# Patient Record
Sex: FEMALE | Employment: UNEMPLOYED | URBAN - METROPOLITAN AREA
[De-identification: names, ages, dates, MRNs, and addresses within clinical notes are randomized per-mention and may not be internally consistent; named-entity substitution may affect disease eponyms.]

---

## 2018-01-16 ENCOUNTER — TRANSFERRED RECORDS (OUTPATIENT)
Dept: HEALTH INFORMATION MANAGEMENT | Facility: CLINIC | Age: 63
End: 2018-01-16

## 2018-01-16 ENCOUNTER — MEDICAL CORRESPONDENCE (OUTPATIENT)
Dept: HEALTH INFORMATION MANAGEMENT | Facility: CLINIC | Age: 63
End: 2018-01-16

## 2018-04-13 ENCOUNTER — TRANSFERRED RECORDS (OUTPATIENT)
Dept: HEALTH INFORMATION MANAGEMENT | Facility: CLINIC | Age: 63
End: 2018-04-13

## 2018-06-28 ENCOUNTER — TRANSFERRED RECORDS (OUTPATIENT)
Dept: HEALTH INFORMATION MANAGEMENT | Facility: CLINIC | Age: 63
End: 2018-06-28

## 2018-10-10 ENCOUNTER — OFFICE VISIT (OUTPATIENT)
Dept: ENDOCRINOLOGY | Facility: CLINIC | Age: 63
End: 2018-10-10
Payer: COMMERCIAL

## 2018-10-10 VITALS
HEART RATE: 80 BPM | WEIGHT: 153.2 LBS | HEIGHT: 64 IN | SYSTOLIC BLOOD PRESSURE: 123 MMHG | BODY MASS INDEX: 26.15 KG/M2 | DIASTOLIC BLOOD PRESSURE: 69 MMHG

## 2018-10-10 DIAGNOSIS — E05.00 GRAVES DISEASE: Primary | ICD-10-CM

## 2018-10-10 RX ORDER — GABAPENTIN 300 MG/1
600 CAPSULE ORAL
COMMUNITY
Start: 2017-07-14

## 2018-10-10 RX ORDER — METHIMAZOLE 10 MG/1
5 TABLET ORAL 3 TIMES DAILY
COMMUNITY
Start: 2017-06-26 | End: 2019-01-23

## 2018-10-10 ASSESSMENT — PAIN SCALES - GENERAL: PAINLEVEL: NO PAIN (0)

## 2018-10-10 NOTE — PATIENT INSTRUCTIONS
- Continue Methimazole 15 mg (3 tablets of 5 mg each) for the next month  - Take the lab slip to S lab in 1 month for a thyroid lab.   - If you do not hear from me about your test in 1 week, call our clinic at [phone].     Follow up: 3 months    Schedule with me on a Wednesday or a Friday.       Yarelsi Petersen MD  Endocrinology, Diabetes and Metabolism  [facility]

## 2018-10-10 NOTE — LETTER
10/10/2018       RE: Annamaria Hall  [address]  [facility] [address]     Dear Colleague,    Thank you for referring your patient, Annamaria Hall, to the [facility] ENDOCRINOLOGY at [facility]. Please see a copy of my visit note below.    Endocrinology Clinic Visit 10/10/2018    NAME:  Annamaria Hall  PCP:  No primary care provider on file.  MRN:  [id number]  Reason for Consult:  Graves disease f/up  Requesting Provider:  Referred Self    Chief Complaint     Chief Complaint   Patient presents with     Consult     hypothyroidism        History of Present Illness     Annamaria Hall is a 62 year old female who is seen in clinic for f/up on Graves disease.     She is well known to me from [place], [place]. Briefly, she was diagnosed with subclinical hyperthyroidism in Nov 2015 with suppressed TSH, normal T4 and T3. TPO was elevated. She has a daughter with hyperthyroidism as well.   Workup done by me in Dec 2015 showed elevated free T3 of 4.0, a suppressed TSH of <0.05 and a normal free T4.   I-123 uptake and scan done Jan 2016 showed a mildly elevated uptake of 35.8% at 24 hrs, that was homogeneous, consistent with Graves disease. Methimazole was started Feb 2016. TSH was 0.01 June 207. At that time she was on methimazole 5 mg daily, and since then I increased her methimazole to 10 mg daily. No TSH since in Care Everywhere.      Labs done at [facility] 9/26/2018: TSH 0.02, fT4: 2.77.   Her PCP increased her dosage to 15 mg daily as of 9/26/18.   Today she feels well. She denies any palpitations, tremors, weight gain or loss,sleep disturbances, bowel issues, anxiety, etc....    Problem List     Patient Active Problem List   Diagnosis     Graves disease        Medications     Current Outpatient Prescriptions   Medication     gabapentin (NEURONTIN) 300 MG capsule     methimazole (TAPAZOLE) 10 MG tablet     No current facility-administered medications for this visit.         Allergies  "    Allergies   Allergen Reactions     Codeine Nausea and Vomiting     Lidocaine Nausea and Vomiting     Lidocaine patch       Medical / Surgical History     No past medical history on file.  No past surgical history on file.    Social History     Social History     Social History     Marital status: Single     Spouse name: N/A     Number of children: N/A     Years of education: N/A     Occupational History     Not on file.     Social History Main Topics     Smoking status: Not on file     Smokeless tobacco: Not on file     Alcohol use Not on file     Drug use: Not on file     Sexual activity: Not on file     Other Topics Concern     Not on file     Social History Narrative       Family History     No family history on file.    ROS     Constitutional: no fevers, chills, night sweats. No weight loss/gain. No fatigue. Good appetite  Eyes: no vision changes, no eye redness, no diplopia  Ears, Nose, mouth, throat: no hearing changes, no tinnitus, no rhinorrhea, no nasal congestion  Cardiovascular: no chest pain, no orthopnea or PND, no edema, no palpitations  Respiratory: no dyspnea, no cough, no sputum, no wheezing  Gastrointestinal: no nausea, no vomiting, no abdominal pain, no diarrhea, no constipation  Genitourinary: no dysuria, no frequency, no urgency, no nocturia  Musculoskeletal: no joint pains, no back pain, no cramps, no fractures  Skin: no rash, no itching, no dryness, no ulcers, no hair loss, no nail changes  Neurological:no headaches, no weakness, no numbness, no tingling, no tremors, no difficulty sleeping  Psychiatric: no anxiety, no sadness  Hematologic/lymphatic: no easy bruising, no bleeding, no palor    Physical Exam   /69  Pulse 80  Ht 1.626 m (5' 4\")  Wt 69.5 kg (153 lb 3.2 oz)  BMI 26.3 kg/m2     General: Comfortable, no obvious distress, normal body habitus  Eyes: Sclera anicteric, moist conjunctiva  HENT: Atraumatic, oropharynx clear, moist mucous membranes with no mucosal " ulcerations  Neck: Trachea midline, supple. Thyroid: Thyroid is normal in size and texture  CV: Regular rhythm, normal rate. No murmurs auscultated  Resp: Clear to auscultation bilaterally, good effort  Abdomen:  Soft, non tender, non distended. Bowel sounds heard. No organomegaly. No striae  Skin: No rashes, lesions, or subcutaneous nodules.   Psych: Alert and oriented x 3. Appropriate affect, good insight  Extremities: No peripheral edema  Musculoskeletal: Appropriate muscle bulk and strength  Lymphatic: No cervical lymphadenopathy  Neuro: Moves all four extremities. No focal deficits on limited exam. Gait normal.     Labs/Imaging     Pertinent Labs were reviewed and updated in EPIC.  I personally reviewed the patient's outside records from Care Everywhere. Summary of pertinent findings in HPI.    Impression / Plan     1. Graves disease:   Has been on higher dose of methimazole 15 mg daily for 2 weeks  Plan:   - continue current dose  - recheck labs in one month  Lab slip given to be done at [facility]        Follow up: 3 months      Yarelis Petersen MD  Endocrinology, Diabetes and Metabolism  [facility]      Again, thank you for allowing me to participate in the care of your patient.      Sincerely,    Yarelis Petersen MD

## 2018-10-10 NOTE — PROGRESS NOTES
Endocrinology Clinic Visit 10/10/2018    NAME:  Annamaria Hall  PCP:  No primary care provider on file.  MRN:  [id number]  Reason for Consult:  Graves disease f/up  Requesting Provider:  Referred Self    Chief Complaint     Chief Complaint   Patient presents with     Consult     hypothyroidism        History of Present Illness     Annamaria Hall is a 62 year old female who is seen in clinic for f/up on Graves disease.     She is well known to me from [place], [place]. Briefly, she was diagnosed with subclinical hyperthyroidism in Nov 2015 with suppressed TSH, normal T4 and T3. TPO was elevated. She has a daughter with hyperthyroidism as well.   Workup done by me in Dec 2015 showed elevated free T3 of 4.0, a suppressed TSH of <0.05 and a normal free T4.   I-123 uptake and scan done Jan 2016 showed a mildly elevated uptake of 35.8% at 24 hrs, that was homogeneous, consistent with Graves disease. Methimazole was started Feb 2016. TSH was 0.01 June 207. At that time she was on methimazole 5 mg daily, and since then I increased her methimazole to 10 mg daily. No TSH since in Care Everywhere.      Labs done at [facility] 9/26/2018: TSH 0.02, fT4: 2.77.   Her PCP increased her dosage to 15 mg daily as of 9/26/18.   Today she feels well. She denies any palpitations, tremors, weight gain or loss,sleep disturbances, bowel issues, anxiety, etc....    Problem List     Patient Active Problem List   Diagnosis     Graves disease        Medications     Current Outpatient Prescriptions   Medication     gabapentin (NEURONTIN) 300 MG capsule     methimazole (TAPAZOLE) 10 MG tablet     No current facility-administered medications for this visit.         Allergies     Allergies   Allergen Reactions     Codeine Nausea and Vomiting     Lidocaine Nausea and Vomiting     Lidocaine patch       Medical / Surgical History     No past medical history on file.  No past surgical history on file.    Social History     Social History     Social History  "    Marital status: Single     Spouse name: N/A     Number of children: N/A     Years of education: N/A     Occupational History     Not on file.     Social History Main Topics     Smoking status: Not on file     Smokeless tobacco: Not on file     Alcohol use Not on file     Drug use: Not on file     Sexual activity: Not on file     Other Topics Concern     Not on file     Social History Narrative       Family History     No family history on file.    ROS     Constitutional: no fevers, chills, night sweats. No weight loss/gain. No fatigue. Good appetite  Eyes: no vision changes, no eye redness, no diplopia  Ears, Nose, mouth, throat: no hearing changes, no tinnitus, no rhinorrhea, no nasal congestion  Cardiovascular: no chest pain, no orthopnea or PND, no edema, no palpitations  Respiratory: no dyspnea, no cough, no sputum, no wheezing  Gastrointestinal: no nausea, no vomiting, no abdominal pain, no diarrhea, no constipation  Genitourinary: no dysuria, no frequency, no urgency, no nocturia  Musculoskeletal: no joint pains, no back pain, no cramps, no fractures  Skin: no rash, no itching, no dryness, no ulcers, no hair loss, no nail changes  Neurological:no headaches, no weakness, no numbness, no tingling, no tremors, no difficulty sleeping  Psychiatric: no anxiety, no sadness  Hematologic/lymphatic: no easy bruising, no bleeding, no palor    Physical Exam   /69  Pulse 80  Ht 1.626 m (5' 4\")  Wt 69.5 kg (153 lb 3.2 oz)  BMI 26.3 kg/m2     General: Comfortable, no obvious distress, normal body habitus  Eyes: Sclera anicteric, moist conjunctiva  HENT: Atraumatic, oropharynx clear, moist mucous membranes with no mucosal ulcerations  Neck: Trachea midline, supple. Thyroid: Thyroid is normal in size and texture  CV: Regular rhythm, normal rate. No murmurs auscultated  Resp: Clear to auscultation bilaterally, good effort  Abdomen:  Soft, non tender, non distended. Bowel sounds heard. No organomegaly. No " striae  Skin: No rashes, lesions, or subcutaneous nodules.   Psych: Alert and oriented x 3. Appropriate affect, good insight  Extremities: No peripheral edema  Musculoskeletal: Appropriate muscle bulk and strength  Lymphatic: No cervical lymphadenopathy  Neuro: Moves all four extremities. No focal deficits on limited exam. Gait normal.     Labs/Imaging     Pertinent Labs were reviewed and updated in EPIC.  I personally reviewed the patient's outside records from Care Everywhere. Summary of pertinent findings in HPI.    Impression / Plan     1. Graves disease:   Has been on higher dose of methimazole 15 mg daily for 2 weeks  Plan:   - continue current dose  - recheck labs in one month  Lab slip given to be done at [facility] [place]        Follow up: 3 months      Yarelis Petersen MD  Endocrinology, Diabetes and Metabolism  [facility]

## 2018-10-10 NOTE — MR AVS SNAPSHOT
After Visit Summary   10/10/2018    Annamaria Hall    MRN: [id number]           Patient Information     Date Of Birth          1955        Visit Information        Provider Department      10/10/2018 3:00 PM Yarelis Petersen MD [facility] Endocrinology        Today's Diagnoses     Graves disease    -  1      Care Instructions    - Continue Methimazole 15 mg (3 tablets of 5 mg each) for the next month  - Take the lab slip to S lab in 1 month for a thyroid lab.   - If you do not hear from me about your test in 1 week, call our clinic at [phone].     Follow up: 3 months    Schedule with me on a Wednesday or a Friday.       Yarelis Petersen MD  Endocrinology, Diabetes and Metabolism  [facility]            Follow-ups after your visit        Follow-up notes from your care team     Return in about 3 months (around 1/10/2019).      Your next 10 appointments already scheduled     Jan 11, 2019 11:00 AM CST   (Arrive by 10:45 AM)   RETURN ENDOCRINE with Yarelis Petersen MD   [facility] Endocr[facility] ([facility])    [address] [place] [id number] [phone]              Who to contact     Please call your clinic at [phone] to:    Ask questions about your health    Make or cancel appointments    Discuss your medicines    Learn about your test results    Speak to your doctor            Additional Information About Your Visit        MyChart Information     [employer] is an electronic gateway that provides easy, online access to your medical records. With [employer], you can request a clinic appointment, read your test results, renew a prescription or communicate with your care team.     To sign up for [employer] visit the website at [email or web address]   You will be asked to enter the access code listed below, as well as some personal information. Please follow the directions to create your username and password.     Your access  "code is: [email or web address]  Expires: 2018  6:31 AM     Your access code will  in 90 days. If you need help or a new code, please contact your [facility] or call [phone] for assistance.        Care EveryWhere ID     This is your Care EveryWhere ID. This could be used by other organizations to access your [facility] medical records  [id number]        Your Vitals Were     Pulse Height BMI (Body Mass Index)             80 1.626 m (5' 4\") 26.3 kg/m2          Blood Pressure from Last 3 Encounters:   10/10/18 123/69    Weight from Last 3 Encounters:   10/10/18 69.5 kg (153 lb 3.2 oz)              Today, you had the following     No orders found for display       Primary Care Provider    None Specified       No primary provider on file.        Equal Access to Services     RAFACedars-Sinai Medical CenterMELI : Hadii alesha Ch, waaxda patrickqadaha, qaybta kaalmada manuel, mabel lin . So [facility] [phone].    ATENCIÓN: Si habla español, tiene a bourgeois disposición servicios gratuitos de asistencia lingüística. Bettie al [phone].    We comply with applicable federal civil rights laws and [place] laws. We do not discriminate on the basis of race, color, national origin, age, disability, sex, sexual orientation, or gender identity.            Thank you!     Thank you for choosing [facility]  for your care. Our goal is always to provide you with excellent care. Hearing back from our patients is one way we can continue to improve our services. Please take a few minutes to complete the written survey that you may receive in the mail after your visit with us. Thank you!             Your Updated Medication List - Protect others around you: Learn how to safely use, store and throw away your medicines at www.disposemymeds.org.          This list is accurate as of 10/10/18  3:07 PM.  Always use your most recent med list.                   Brand Name Dispense " Instructions for use Diagnosis    gabapentin 300 MG capsule    NEURONTIN     600 mg        methimazole 10 MG tablet    TAPAZOLE     5 mg 3 times daily

## 2018-11-15 ENCOUNTER — TRANSFERRED RECORDS (OUTPATIENT)
Dept: HEALTH INFORMATION MANAGEMENT | Facility: CLINIC | Age: 63
End: 2018-11-15

## 2018-11-15 LAB
ALBUMIN SERPL-MCNC: 4.3 G/DL
ALP SERPL-CCNC: 180 U/L
ALT SERPL-CCNC: 30 U/L
ANION GAP SERPL CALCULATED.3IONS-SCNC: 14.1 MMOL/L
ANION GAP SERPL CALCULATED.3IONS-SCNC: NORMAL MMOL/L
AST SERPL-CCNC: 27 U/L
BASOPHILS # BLD AUTO: 0.03 X10E3/UL
BASOPHILS NFR BLD AUTO: 0.4 %
BILIRUB SERPL-MCNC: 0.2 MG/DL
BUN SERPL-MCNC: 11 MG/DL
BUN SERPL-MCNC: NORMAL MG/DL
BUN/CREATININE RATIO: NORMAL
CALCIUM SERPL-MCNC: 9.6 MG/DL
CALCIUM SERPL-MCNC: NORMAL MG/DL
CHLORIDE SERPLBLD-SCNC: 109 MMOL/L
CHLORIDE SERPLBLD-SCNC: NORMAL MMOL/L
CHOL/HDL RATIO - HISTORICAL: 3
CHOLEST SERPL-MCNC: 150 MG/DL
CO2 SERPL-SCNC: 26 MMOL/L
CO2 SERPL-SCNC: NORMAL MMOL/L
CREAT SERPL-MCNC: 0.6 MG/DL
CREAT SERPL-MCNC: NORMAL MG/DL
EOSINOPHIL # BLD AUTO: 0.22 X10E3/UL
EOSINOPHIL NFR BLD AUTO: 2.6 %
ERYTHROCYTE [SEDIMENTATION RATE] IN BLOOD: 16 MM/HR
GFR SERPL CREATININE-BSD FRML MDRD: >60 ML/MIN/1.73M2
GFR SERPL CREATININE-BSD FRML MDRD: NORMAL ML/MIN/1.73M2
GLOBULIN: 4.1 G/DL
GLUCOSE SERPL-MCNC: 103 MG/DL (ref 70–99)
GLUCOSE SERPL-MCNC: NORMAL MG/DL (ref 70–99)
HBA1C MFR BLD: 5.7 % (ref 0–5.7)
HCT VFR BLD AUTO: 43.8 %
HDLC SERPL-MCNC: 48 MG/DL
HEMOGLOBIN: 14.4 G/DL
LDL CHOLESTEROL: 70 MG/DL
LYMPHOCYTES # BLD AUTO: 3.29 X10E3/UL
LYMPHOCYTES NFR BLD AUTO: 39.3 %
MCH RBC QN AUTO: 25.9 PG
MCHC RBC AUTO-ENTMCNC: 32.9 G/DL
MCV RBC AUTO: 78.8 FL
MONOCYTES # BLD AUTO: 0.58 X10E3/UL
MONOCYTES NFR BLD AUTO: 6.9 %
NEUTROPHILS # BLD AUTO: 4.25 X10E3/UL
NEUTROPHILS NFR BLD AUTO: 50.7 %
PLATELET COUNT - QUEST: 264 10^9/L (ref 150–450)
PMV BLD: 10.3 FL
POTASSIUM SERPL-SCNC: 4.3 MMOL/L
POTASSIUM SERPL-SCNC: NORMAL MMOL/L
PROT SERPL-MCNC: 8.4 G/DL
PROTEIN C ACTIVITY: <0.5 % NORMAL
RBC # BLD AUTO: 5.56 10^12/L
SODIUM SERPL-SCNC: 144 MMOL/L
SODIUM SERPL-SCNC: NORMAL MMOL/L
T4 FREE SERPL-MCNC: 1.27 NG/DL
TRIGL SERPL-MCNC: 161 MG/DL
TSH SERPL-ACNC: <0.02 MCU/ML
WBC # BLD AUTO: 8.4 10^9/L

## 2018-11-16 ENCOUNTER — DOCUMENTATION ONLY (OUTPATIENT)
Dept: ENDOCRINOLOGY | Facility: CLINIC | Age: 63
End: 2018-11-16

## 2018-11-20 ENCOUNTER — TELEPHONE (OUTPATIENT)
Dept: ENDOCRINOLOGY | Facility: CLINIC | Age: 63
End: 2018-11-20

## 2018-11-20 LAB
ALBUMIN SERPL-MCNC: 4.3 G/DL (ref 3.5–5)
ALP SERPL-CCNC: 180 U/L (ref 22–124)
ALT SERPL-CCNC: 30 U/L (ref 9–52)
ANION GAP SERPL CALCULATED.3IONS-SCNC: 14.1 MMOL/L (ref 10–20)
AST SERPL-CCNC: 27 U/L (ref 14–36)
BASOPHILS # BLD AUTO: 0.03 X10E3/UL (ref 0–0.2)
BASOPHILS NFR BLD AUTO: 0.4 % (ref 0–2)
BILIRUB SERPL-MCNC: 0.2 MG/DL (ref 0.2–1)
BILIRUBIN DIRECT: 0 MG/DL (ref 0–1)
BUN SERPL-MCNC: 11 MG/DL (ref 9–20)
CALCIUM SERPL-MCNC: 9.6 MG/DL (ref 8.4–10.2)
CHLORIDE SERPLBLD-SCNC: 109 MMOL/L (ref 98–107)
CHOL/HDL RATIO - HISTORICAL: 3
CHOLEST SERPL-MCNC: 150 MG/DL
CO2 SERPL-SCNC: 26 MMOL/L (ref 22–30)
CREAT SERPL-MCNC: 0.6 MG/DL (ref 0.5–1)
EOSINOPHIL # BLD AUTO: 0.22 X10E3/UL (ref 0–0.7)
EOSINOPHIL NFR BLD AUTO: 2.6 % (ref 0–3)
ERYTHROCYTE [SEDIMENTATION RATE] IN BLOOD: 16 MM/HR (ref 0–20)
GLOBULIN: 4.1 G/DL (ref 2.5–3.5)
GLUCOSE SERPL-MCNC: 103 MG/DL (ref 65–100)
HBA1C MFR BLD: 5.7 % (ref 4–6)
HCT VFR BLD AUTO: 43.8 % (ref 37–47)
HDLC SERPL-MCNC: 48 MG/DL
HEMOGLOBIN: 14.4 G/DL (ref 12–16)
LDL CHOLESTEROL: 70 MG/DL
LYMPHOCYTES # BLD AUTO: 3.29 X10E3/UL (ref 0.8–4.1)
LYMPHOCYTES NFR BLD AUTO: 39.3 % (ref 20–45)
Lab: <0.5
MCH RBC QN AUTO: 25.9 PG (ref 27–35)
MCHC RBC AUTO-ENTMCNC: 32.9 G/DL (ref 33–37)
MCV RBC AUTO: 78.8 FL (ref 81–99)
MONOCYTES # BLD AUTO: 0.58 X10E3/UL (ref 0–1)
MONOCYTES NFR BLD AUTO: 6.9 % (ref 0–4)
NEUTROPHILS # BLD AUTO: 4.25 X10E3/UL (ref 1.8–8)
NEUTROPHILS NFR BLD AUTO: 50.7 % (ref 54–75)
PLATELET COUNT - QUEST: 264 10^9/L (ref 150–350)
PMV BLD: 10.3 FL (ref 7.4–10.4)
POTASSIUM SERPL-SCNC: 4.3 MMOL/L (ref 3.6–5)
PROT SERPL-MCNC: 8.4 G/DL (ref 6.3–8.2)
RBC # BLD AUTO: 5.56 10^12/L (ref 4.2–5.4)
SODIUM SERPL-SCNC: 144 MMOL/L (ref 137–145)
T4 FREE SERPL-MCNC: 1.27 NG/DL (ref 0.71–1.85)
TRIGL SERPL-MCNC: 161 MG/DL
TSH SERPL-ACNC: 0.02 MCU/ML (ref 0.47–4.68)
WBC # BLD AUTO: 8.4 10^9/L (ref 4.8–10.8)

## 2018-11-20 NOTE — PROGRESS NOTES
RE:  Yarelis Petersen MD  P [facility]                     This patient needed a thyroid lab checked about now by S in [place]. I got a result for BMP. Do you know if a thyroid panel was done?   Yarelis        Main Phone: [phone], 3,    [facility]  Gerri will fax today.

## 2018-11-21 ENCOUNTER — TELEPHONE (OUTPATIENT)
Dept: ENDOCRINOLOGY | Facility: CLINIC | Age: 63
End: 2018-11-21

## 2018-11-21 NOTE — TELEPHONE ENCOUNTER
----- Message from Pushpa Madison RN sent at 11/21/2018 10:30 AM CST -----  Regarding: FW: lab results   Let  Pt know please   ----- Message -----     From: Yarelis Petersen MD     Sent: 11/21/2018   9:40 AM       To: [facility] T[facility]-  Subject: FW: lab results                                  Please call patient and let her know her free T4 is now down to normal, so please continue her methimazole dose of 15 mg daily, and I will recheck her labs when she sees me in clinic next time.   Thanks  Yarelis    ----- Message -----     From: Iona Hankins RN     Sent: 11/20/2018   2:40 PM       To: Yarelis Petersen MD  Subject: lab results                                      [facility] lab results received, re-filed for , however,   FT4: 1.27 and TSH 0.02 Thank you.

## 2018-11-21 NOTE — TELEPHONE ENCOUNTER
Spoke with patient and explained to her free T4 was down to normal and to continue Methimazole dose of 15 mg daily and to repeat labs next clinic visit.

## 2019-01-16 ENCOUNTER — OFFICE VISIT (OUTPATIENT)
Dept: ENDOCRINOLOGY | Facility: CLINIC | Age: 64
End: 2019-01-16
Payer: COMMERCIAL

## 2019-01-16 VITALS
BODY MASS INDEX: 26.29 KG/M2 | WEIGHT: 154 LBS | HEART RATE: 90 BPM | SYSTOLIC BLOOD PRESSURE: 138 MMHG | DIASTOLIC BLOOD PRESSURE: 74 MMHG | HEIGHT: 64 IN

## 2019-01-16 DIAGNOSIS — E11.9 TYPE 2 DIABETES MELLITUS WITHOUT COMPLICATION, WITHOUT LONG-TERM CURRENT USE OF INSULIN (H): ICD-10-CM

## 2019-01-16 DIAGNOSIS — E05.00 GRAVES DISEASE: ICD-10-CM

## 2019-01-16 DIAGNOSIS — E05.00 GRAVES DISEASE: Primary | ICD-10-CM

## 2019-01-16 LAB
T3 SERPL-MCNC: 348 NG/DL (ref 60–181)
T4 FREE SERPL-MCNC: 2.15 NG/DL (ref 0.76–1.46)
TSH SERPL DL<=0.005 MIU/L-ACNC: <0.01 MU/L (ref 0.4–4)

## 2019-01-16 ASSESSMENT — MIFFLIN-ST. JEOR: SCORE: 1243.54

## 2019-01-16 ASSESSMENT — PAIN SCALES - GENERAL: PAINLEVEL: NO PAIN (0)

## 2019-01-16 NOTE — LETTER
Date:January 17, 2019      Patient was self referred, no letter generated. Do not send.        [facility] Information

## 2019-01-16 NOTE — PROGRESS NOTES
Endocrinology Clinic Visit 10/10/2018    NAME:  Annamaria Hall  PCP:  No primary care provider on file.  MRN:  [id number]  Reason for Consult:  Graves disease f/up  Requesting Provider:  Referred Self    Chief Complaint     Chief Complaint   Patient presents with     RECHECK     graves disease        History of Present Illness     Annamaria Hall is a 62 year old female who is seen in clinic for f/up on Graves disease.     She is well known to me from [place], [place]. Briefly, she was diagnosed with subclinical hyperthyroidism in Nov 2015 with suppressed TSH, normal T4 and T3. TPO was elevated. She has a daughter with hyperthyroidism as well.   Workup done by me in Dec 2015 showed elevated free T3 of 4.0, a suppressed TSH of <0.05 and a normal free T4.   I-123 uptake and scan done Jan 2016 showed a mildly elevated uptake of 35.8% at 24 hrs, that was homogeneous, consistent with Graves disease. Methimazole was started Feb 2016. TSH was 0.01 June 207. At that time she was on methimazole 5 mg daily, and since then I increased her methimazole to 10 mg daily.   Labs done at [facility] 9/26/2018: TSH 0.02, fT4: 2.77.   Her PCP increased her dosage to 15 mg daily as of 9/26/18, which she is taking as 3 tablets of 5 mg each, all at once every morning.  I advised her to continue her dose and recheck her labs in November.  Labs checked at [facility] in November 2018 showed a TSH of 0.02, and normal free T4 of 1.27.  I advised her to continue the same dose.  She is here today for follow-up.    Today she feels well.  Her only new complaint is heartburn and reflux, which she sometimes also feels as palpitations, usually triggered by eating.  She is going to discuss this with her PCP tomorrow.  She denies any heat or cold intolerance, tremors, weight changes, or changes in her bowel habits.    She also has a diagnosis of type 2 diabetes for which she used to take glipizide, but more recently has been not taking any medications, and her last  hemoglobin A1c was 5.7 in November 2018.  She checks her blood glucose almost once a day and it usually ranges between 120 and 140 either fasting or at bedtime.  If she ever has a blood glucose over 200 she would take 1 glipizide pill.  That is extremely rare.      Problem List     Patient Active Problem List   Diagnosis     Graves disease        Medications     Current Outpatient Medications   Medication     gabapentin (NEURONTIN) 300 MG capsule     methimazole (TAPAZOLE) 10 MG tablet     No current facility-administered medications for this visit.         Allergies     Allergies   Allergen Reactions     Codeine Nausea and Vomiting     Lidocaine Nausea and Vomiting     Lidocaine patch       Medical / Surgical History     No past medical history on file.  No past surgical history on file.    Social History     Social History     Socioeconomic History     Marital status: Single     Spouse name: Not on file     Number of children: Not on file     Years of education: Not on file     Highest education level: Not on file   Social Needs     Financial resource strain: Not on file     Food insecurity - worry: Not on file     Food insecurity - inability: Not on file     Transportation needs - medical: Not on file     Transportation needs - non-medical: Not on file   Occupational History     Not on file   Tobacco Use     Smoking status: Not on file   Substance and Sexual Activity     Alcohol use: Not on file     Drug use: Not on file     Sexual activity: Not on file   Other Topics Concern     Not on file   Social History Narrative     Not on file       Family History     No family history on file.    ROS     Constitutional: no fevers, chills, night sweats. No weight loss/gain. No fatigue. Good appetite  Eyes: no vision changes, no eye redness, no diplopia  Ears, Nose, mouth, throat: no hearing changes, no tinnitus, no rhinorrhea, no nasal congestion  Cardiovascular: no chest pain, no orthopnea or PND, no edema, no  "palpitations  Respiratory: no dyspnea, no cough, no sputum, no wheezing  Gastrointestinal: no nausea, no vomiting, no abdominal pain, no diarrhea, no constipation  Genitourinary: no dysuria, no frequency, no urgency, no nocturia  Musculoskeletal: no joint pains, no back pain, no cramps, no fractures  Skin: no rash, no itching, no dryness, no ulcers, no hair loss, no nail changes  Neurological:no headaches, no weakness, no numbness, no tingling, no tremors, no difficulty sleeping  Psychiatric: no anxiety, no sadness  Hematologic/lymphatic: no easy bruising, no bleeding, no palor    Physical Exam   /74   Pulse 90   Ht 1.626 m (5' 4\")   Wt 69.9 kg (154 lb)   BMI 26.43 kg/m       General: Comfortable, no obvious distress, normal body habitus  Eyes: Sclera anicteric, moist conjunctiva  HENT: Atraumatic, oropharynx clear, moist mucous membranes with no mucosal ulcerations  Neck: Trachea midline, supple. Thyroid: Thyroid is normal in size and texture  CV: Regular rhythm, normal rate. No murmurs auscultated  Resp: Clear to auscultation bilaterally, good effort  Abdomen:  Soft, non tender, non distended. Bowel sounds heard. No organomegaly. No striae  Skin: No rashes, lesions, or subcutaneous nodules.   Psych: Alert and oriented x 3. Appropriate affect, good insight  Extremities: No peripheral edema  Musculoskeletal: Appropriate muscle bulk and strength  Lymphatic: No cervical lymphadenopathy  Neuro: Moves all four extremities. No focal deficits on limited exam. Gait normal.     Labs/Imaging     Pertinent Labs were reviewed and updated in EPIC.  I personally reviewed the patient's outside records from Care Everywhere. Summary of pertinent findings in HPI.    Impression / Plan     1. Graves disease:   Has been on higher dose of methimazole 15 mg daily since September 2018.  Labs November 2018 showed normalization of her free T4, but continued suppression of her TSH.    Plan:   -We will check TSH, free T4, and total " T3 today  -If her TSH still suppressed, or her total T3 is elevated, I will increase her dose to 20 mg daily of methimazole  -If her labs are all normal we will continue 15 mg daily  We may also be able to reduce the dose of her TSH is elevated.    2. Type 2 diabetes  It is diet controlled.  A1c 5.7.  I congratulated her on her success and advise continuing to follow a healthy lifestyle with diet and exercise.    Follow up: 6 months      Yarelis Petersen MD  Endocrinology, Diabetes and Metabolism  [facility]

## 2019-01-16 NOTE — LETTER
1/16/2019       RE: Annamaria Hall  [address]  [facility] [address]     Dear Colleague,    Thank you for referring your patient, Annamaria Hall, to the [facility] ENDOCRINOLOGY at [facility]. Please see a copy of my visit note below.    Endocrinology Clinic Visit 10/10/2018    NAME:  Annamaria Hall  PCP:  No primary care provider on file.  MRN:  [id number]  Reason for Consult:  Graves disease f/up  Requesting Provider:  Referred Self    Chief Complaint     Chief Complaint   Patient presents with     RECHECK     graves disease        History of Present Illness     Annamaria Hall is a 62 year old female who is seen in clinic for f/up on Graves disease.     She is well known to me from [place], [place]. Briefly, she was diagnosed with subclinical hyperthyroidism in Nov 2015 with suppressed TSH, normal T4 and T3. TPO was elevated. She has a daughter with hyperthyroidism as well.   Workup done by me in Dec 2015 showed elevated free T3 of 4.0, a suppressed TSH of <0.05 and a normal free T4.   I-123 uptake and scan done Jan 2016 showed a mildly elevated uptake of 35.8% at 24 hrs, that was homogeneous, consistent with Graves disease. Methimazole was started Feb 2016. TSH was 0.01 June 207. At that time she was on methimazole 5 mg daily, and since then I increased her methimazole to 10 mg daily.   Labs done at [facility] 9/26/2018: TSH 0.02, fT4: 2.77.   Her PCP increased her dosage to 15 mg daily as of 9/26/18, which she is taking as 3 tablets of 5 mg each, all at once every morning.  I advised her to continue her dose and recheck her labs in November.  Labs checked at [facility] in November 2018 showed a TSH of 0.02, and normal free T4 of 1.27.  I advised her to continue the same dose.  She is here today for follow-up.    Today she feels well.  Her only new complaint is heartburn and reflux, which she sometimes also feels as palpitations, usually triggered by eating.  She is going to discuss  this with her PCP tomorrow.  She denies any heat or cold intolerance, tremors, weight changes, or changes in her bowel habits.    She also has a diagnosis of type 2 diabetes for which she used to take glipizide, but more recently has been not taking any medications, and her last hemoglobin A1c was 5.7 in November 2018.  She checks her blood glucose almost once a day and it usually ranges between 120 and 140 either fasting or at bedtime.  If she ever has a blood glucose over 200 she would take 1 glipizide pill.  That is extremely rare.      Problem List     Patient Active Problem List   Diagnosis     Graves disease        Medications     Current Outpatient Medications   Medication     gabapentin (NEURONTIN) 300 MG capsule     methimazole (TAPAZOLE) 10 MG tablet     No current facility-administered medications for this visit.         Allergies     Allergies   Allergen Reactions     Codeine Nausea and Vomiting     Lidocaine Nausea and Vomiting     Lidocaine patch       Medical / Surgical History     No past medical history on file.  No past surgical history on file.    Social History     Social History     Socioeconomic History     Marital status: Single     Spouse name: Not on file     Number of children: Not on file     Years of education: Not on file     Highest education level: Not on file   Social Needs     Financial resource strain: Not on file     Food insecurity - worry: Not on file     Food insecurity - inability: Not on file     Transportation needs - medical: Not on file     Transportation needs - non-medical: Not on file   Occupational History     Not on file   Tobacco Use     Smoking status: Not on file   Substance and Sexual Activity     Alcohol use: Not on file     Drug use: Not on file     Sexual activity: Not on file   Other Topics Concern     Not on file   Social History Narrative     Not on file       Family History     No family history on file.    ROS     Constitutional: no fevers, chills, night  "sweats. No weight loss/gain. No fatigue. Good appetite  Eyes: no vision changes, no eye redness, no diplopia  Ears, Nose, mouth, throat: no hearing changes, no tinnitus, no rhinorrhea, no nasal congestion  Cardiovascular: no chest pain, no orthopnea or PND, no edema, no palpitations  Respiratory: no dyspnea, no cough, no sputum, no wheezing  Gastrointestinal: no nausea, no vomiting, no abdominal pain, no diarrhea, no constipation  Genitourinary: no dysuria, no frequency, no urgency, no nocturia  Musculoskeletal: no joint pains, no back pain, no cramps, no fractures  Skin: no rash, no itching, no dryness, no ulcers, no hair loss, no nail changes  Neurological:no headaches, no weakness, no numbness, no tingling, no tremors, no difficulty sleeping  Psychiatric: no anxiety, no sadness  Hematologic/lymphatic: no easy bruising, no bleeding, no palor    Physical Exam   /74   Pulse 90   Ht 1.626 m (5' 4\")   Wt 69.9 kg (154 lb)   BMI 26.43 kg/m        General: Comfortable, no obvious distress, normal body habitus  Eyes: Sclera anicteric, moist conjunctiva  HENT: Atraumatic, oropharynx clear, moist mucous membranes with no mucosal ulcerations  Neck: Trachea midline, supple. Thyroid: Thyroid is normal in size and texture  CV: Regular rhythm, normal rate. No murmurs auscultated  Resp: Clear to auscultation bilaterally, good effort  Abdomen:  Soft, non tender, non distended. Bowel sounds heard. No organomegaly. No striae  Skin: No rashes, lesions, or subcutaneous nodules.   Psych: Alert and oriented x 3. Appropriate affect, good insight  Extremities: No peripheral edema  Musculoskeletal: Appropriate muscle bulk and strength  Lymphatic: No cervical lymphadenopathy  Neuro: Moves all four extremities. No focal deficits on limited exam. Gait normal.     Labs/Imaging     Pertinent Labs were reviewed and updated in EPIC.  I personally reviewed the patient's outside records from [facility]. Summary of pertinent findings " in HPI.    Impression / Plan     1. Graves disease:   Has been on higher dose of methimazole 15 mg daily since September 2018.  Labs November 2018 showed normalization of her free T4, but continued suppression of her TSH.    Plan:   -We will check TSH, free T4, and total T3 today  -If her TSH still suppressed, or her total T3 is elevated, I will increase her dose to 20 mg daily of methimazole  -If her labs are all normal we will continue 15 mg daily  We may also be able to reduce the dose of her TSH is elevated.    2. Type 2 diabetes  It is diet controlled.  A1c 5.7.  I congratulated her on her success and advise continuing to follow a healthy lifestyle with diet and exercise.    Follow up: 6 months      Yarelis Petersen MD  Endocrinology, Diabetes and Metabolism  [facility]      Again, thank you for allowing me to participate in the care of your patient.      Sincerely,    Yarelis Petersen MD

## 2019-01-23 DIAGNOSIS — E05.00 GRAVES DISEASE: Primary | ICD-10-CM

## 2019-01-23 RX ORDER — METHIMAZOLE 10 MG/1
20 TABLET ORAL DAILY
Qty: 180 TABLET | Refills: 3 | Status: SHIPPED | OUTPATIENT
Start: 2019-01-23 | End: 2020-02-18

## 2019-03-21 ENCOUNTER — TRANSFERRED RECORDS (OUTPATIENT)
Dept: HEALTH INFORMATION MANAGEMENT | Facility: OTHER | Age: 64
End: 2019-03-21

## 2019-07-30 ENCOUNTER — TELEPHONE (OUTPATIENT)
Dept: ENDOCRINOLOGY | Facility: CLINIC | Age: 64
End: 2019-07-30

## 2019-07-30 DIAGNOSIS — E05.00 GRAVES DISEASE: Primary | ICD-10-CM

## 2019-07-30 DIAGNOSIS — E11.9 TYPE 2 DIABETES MELLITUS WITHOUT COMPLICATION, WITHOUT LONG-TERM CURRENT USE OF INSULIN (H): ICD-10-CM

## 2019-07-30 NOTE — TELEPHONE ENCOUNTER
[facility] Call Center    Phone Message    May a detailed message be left on voicemail: yes    Reason for Call: Other: Per Pt is wanting to get a call back in regards to wanting to know if on her apt on 10/2/2019 if she is needing to have lab work done. Pt states she is wanting to know before her apt.      Action Taken: Message routed to:  [facility] END[facility]CRINOLOGY ADULT [facility]

## 2019-11-18 ENCOUNTER — TRANSFERRED RECORDS (OUTPATIENT)
Dept: HEALTH INFORMATION MANAGEMENT | Facility: CLINIC | Age: 64
End: 2019-11-18

## 2019-12-19 ENCOUNTER — HOSPITAL ENCOUNTER (OUTPATIENT)
Dept: GENERAL RADIOLOGY | Facility: OTHER | Age: 64
Discharge: HOME | End: 2019-12-19
Attending: NURSE PRACTITIONER | Admitting: NURSE PRACTITIONER

## 2019-12-19 ENCOUNTER — OFFICE VISIT (OUTPATIENT)
Dept: FAMILY MEDICINE | Facility: OTHER | Age: 64
End: 2019-12-19
Attending: NURSE PRACTITIONER

## 2019-12-19 DIAGNOSIS — G89.29 CHRONIC LOW BACK PAIN, UNSPECIFIED BACK PAIN LATERALITY, UNSPECIFIED WHETHER SCIATICA PRESENT: Primary | ICD-10-CM

## 2019-12-19 DIAGNOSIS — G89.29 CHRONIC LOW BACK PAIN, UNSPECIFIED BACK PAIN LATERALITY, UNSPECIFIED WHETHER SCIATICA PRESENT: ICD-10-CM

## 2019-12-19 DIAGNOSIS — Z53.9 ERRONEOUS ENCOUNTER--DISREGARD: ICD-10-CM

## 2019-12-19 DIAGNOSIS — M54.50 CHRONIC LOW BACK PAIN, UNSPECIFIED BACK PAIN LATERALITY, UNSPECIFIED WHETHER SCIATICA PRESENT: ICD-10-CM

## 2019-12-19 DIAGNOSIS — M54.50 CHRONIC LOW BACK PAIN, UNSPECIFIED BACK PAIN LATERALITY, UNSPECIFIED WHETHER SCIATICA PRESENT: Primary | ICD-10-CM

## 2019-12-19 PROCEDURE — 72100 X-RAY EXAM L-S SPINE 2/3 VWS: CPT

## 2019-12-19 PROCEDURE — 72100 X-RAY EXAM L-S SPINE 2/3 VWS: CPT | Performed by: NURSE PRACTITIONER

## 2019-12-19 PROCEDURE — 99499 UNLISTED E&M SERVICE: CPT | Performed by: NURSE PRACTITIONER

## 2020-02-17 DIAGNOSIS — E05.00 GRAVES DISEASE: ICD-10-CM

## 2020-02-18 RX ORDER — METHIMAZOLE 10 MG/1
20 TABLET ORAL DAILY
Qty: 180 TABLET | Refills: 3 | Status: SHIPPED | OUTPATIENT
Start: 2020-02-18 | End: 2020-03-16

## 2020-02-18 NOTE — TELEPHONE ENCOUNTER
methimazole (TAPAZOLE) 10 MG tablet    Last Written Prescription Date:  1/23/19  Last Fill Quantity: 180,   # refills: 3  Last Office Visit : 1/16/19  Future Office visit: 5/27/20    Routing refill request to provider for review/approval because: lv > 12 mths. Not on protocol  Anti-Thyroid Agents Protocol Failed   CBC is on file within the past 12 months    Recent (12 mo) or future (30 days) visit within the authorizing provider's specialty    Refills for this medication group require provider approval    Normal TSH in past 12 months

## 2020-03-13 DIAGNOSIS — E05.00 GRAVES DISEASE: ICD-10-CM

## 2020-03-16 ENCOUNTER — TELEPHONE (OUTPATIENT)
Dept: ENDOCRINOLOGY | Facility: CLINIC | Age: 65
End: 2020-03-16

## 2020-03-16 DIAGNOSIS — E05.00 GRAVES DISEASE: ICD-10-CM

## 2020-03-16 RX ORDER — METHIMAZOLE 10 MG/1
20 TABLET ORAL DAILY
Qty: 180 TABLET | Refills: 1 | Status: SHIPPED | OUTPATIENT
Start: 2020-03-16 | End: 2020-05-27

## 2020-03-16 RX ORDER — METHIMAZOLE 10 MG/1
20 TABLET ORAL DAILY
Qty: 180 TABLET | Refills: 3 | OUTPATIENT
Start: 2020-03-16

## 2020-03-16 NOTE — TELEPHONE ENCOUNTER
[facility] Call Center    Phone Message    May a detailed message be left on voicemail: yes     Reason for Call: Medication Question or concern regarding medication   Prescription Clarification  Name of Medication: methimazole (TAPAZOLE) 10 MG tablet   Prescribing Provider: Dr. Yarelis Petersen   Pharmacy: [facility], # [phone]. You may speak to any pharmacist.    What on the order needs clarification? Pharmacy reporting that they received a print-out from our clinic for this medication request, but it was just a print-out, no provider signature. Please send refill sent by fax with doctor's signature. Thank you.        Action Taken: Message routed to:  [facility]  Endocrine    Travel Screening: Not Applicable

## 2020-03-16 NOTE — TELEPHONE ENCOUNTER
[facility] Call Center    Phone Message    May a detailed message be left on voicemail: no     Reason for Call: Medication Refill Request    Has the patient contacted the pharmacy for the refill? Yes   Name of medication being requested: methimazole (TAPAZOLE) 10 MG tablet   Provider who prescribed the medication: Dr. Petersen  Pharmacy: [facility]  Date medication is needed: ASAP, Pt is OUT, pharmacy has sent multiple requests, most recent communication states duplicate refill, but no recent initial requests documented to prompt this. Please call Pt back.    Action Taken: Message routed to:  [facility] MED REFILL TEAM    Travel Screening: Not Applicable

## 2020-03-17 NOTE — TELEPHONE ENCOUNTER
[facility] Call Center    Phone Message    May a detailed message be left on voicemail: yes     Reason for Call: Other: Please call pt's pharmacy ASAP so she can lorena her medication. Please call [phone]     Action Taken: Message routed to:  [facility] Endo    Travel Screening: Not Applicable

## 2020-03-31 ENCOUNTER — DOCUMENTATION ONLY (OUTPATIENT)
Dept: CARE COORDINATION | Facility: CLINIC | Age: 65
End: 2020-03-31

## 2020-05-26 NOTE — PROGRESS NOTES
"Annamaria Hall is a 64 year old female who is being evaluated via a billable telephone visit.      The patient has been notified of following:     \"This telephone visit will be conducted via a call between you and your physician/provider. We have found that certain health care needs can be provided without the need for a physical exam.  This service lets us provide the care you need with a short phone conversation.  If a prescription is necessary we can send it directly to your pharmacy.  If lab work is needed we can place an order for that and you can then stop by our lab to have the test done at a later time.    Telephone visits are billed at different rates depending on your insurance coverage. During this emergency period, for some insurers they may be billed the same as an in-person visit.  Please reach out to your insurance provider with any questions.    If during the course of the call the physician/provider feels a telephone visit is not appropriate, you will not be charged for this service.\"    Patient has given verbal consent for Telephone visit?  Yes    What phone number would you like to be contacted at? [phone]    How would you like to obtain your AVS? Mail a copy    Phone call duration: 21 minutes    Yarelis Petersen MD    Endocrinology Clinic Visit 5/27/2020  NAME:  Annamaria Hall  PCP:  No primary care provider on file.  MRN:  [id number]    Chief Complaint     Chief Complaint   Patient presents with     RECHECK     THYROID       History of Present Illness     Annamaria Hall is a 64 year old female who is seen called today via telephone visit for f/up on Graves disease.     She was diagnosed with subclinical hyperthyroidism in Nov 2015 with suppressed TSH, normal T4 and T3. TPO was elevated. She has a daughter with hyperthyroidism as well. Workup Dec 2015 showed elevated free T3 of 4.0, a suppressed TSH of <0.05 and a normal free T4. I-123 uptake and scan done Jan 2016 showed a mildly " elevated uptake of 35.8% at 24 hrs, that was homogeneous, consistent with Graves disease. Methimazole was started Feb 2016.     Last visit with me Jan 2019: at tat point, based on Labs done at [facility] 9/26/2018: TSH 0.02, fT4: 2.77, her PCP had increased her MMI dosage to 15 mg daily and then to 20 mg daily.     Interval history:   She has been taking methimazole 20 mg daily.   Last labs at [facility] a couple of months ago. She was everything looked good.   Her BG were high about a month ago.   Weight is up due to the quarantine.  She is on glipizide for her diabetes.     Problem List     Patient Active Problem List   Diagnosis     Graves disease        Medications     Current Outpatient Medications   Medication     gabapentin (NEURONTIN) 300 MG capsule     methimazole (TAPAZOLE) 10 MG tablet     No current facility-administered medications for this visit.         Allergies     Allergies   Allergen Reactions     Codeine Nausea and Vomiting     Lidocaine Nausea and Vomiting     Lidocaine patch       Medical / Surgical History     No past medical history on file.  No past surgical history on file.    Social History     Social History     Socioeconomic History     Marital status: Single     Spouse name: Not on file     Number of children: Not on file     Years of education: Not on file     Highest education level: Not on file   Occupational History     Not on file   Social Needs     Financial resource strain: Not on file     Food insecurity     Worry: Not on file     Inability: Not on file     Transportation needs     Medical: Not on file     Non-medical: Not on file   Tobacco Use     Smoking status: Not on file   Substance and Sexual Activity     Alcohol use: Not on file     Drug use: Not on file     Sexual activity: Not on file   Lifestyle     Physical activity     Days per week: Not on file     Minutes per session: Not on file     Stress: Not on file   Relationships     Social connections     Talks on phone: Not on file      Gets together: Not on file     Attends Adventism service: Not on file     Active member of club or organization: Not on file     Attends meetings of clubs or organizations: Not on file     Relationship status: Not on file     Intimate partner violence     Fear of current or ex partner: Not on file     Emotionally abused: Not on file     Physically abused: Not on file     Forced sexual activity: Not on file   Other Topics Concern     Not on file   Social History Narrative     Not on file       Family History     No family history on file.    ROS     Constitutional: no fevers, chills, night sweats. No weight loss/gain. No fatigue. Good appetite  Eyes: no vision changes, no eye redness, no diplopia  Ears, Nose, mouth, throat: no hearing changes, no tinnitus, no rhinorrhea, no nasal congestion  Cardiovascular: no chest pain, no orthopnea or PND, no edema, no palpitations  Respiratory: no dyspnea, no cough, no sputum, no wheezing  Gastrointestinal: no nausea, no vomiting, no abdominal pain, no diarrhea, no constipation  Genitourinary: no dysuria, no frequency, no urgency, no nocturia  Musculoskeletal: no joint pains, no back pain, no cramps, no fractures  Skin: no rash, no itching, no dryness, no ulcers, no hair loss, no nail changes  Neurological:no headaches, no weakness, no numbness, no tingling, no tremors, no difficulty sleeping  Psychiatric: no anxiety, no sadness  Hematologic/lymphatic: no easy bruising, no bleeding, no palor    Physical Exam   There were no vitals taken for this visit.   No physical exam was done due to this being a telephone visit.     Labs/Imaging     Pertinent Labs were reviewed and updated in EPIC.  I personally reviewed the patient's outside records from [facility]. Summary of pertinent findings in HPI.    Impression / Plan     1. Graves disease:   Has been on methimazole 20 mg daily    Plan:   -We will check TSH  - adjust dose accordingly    2. Type 2 diabetes  Worse control  lately  She is on glipizide  Check a1c     Follow up: 6 months      Yarelis Petersen MD  Endocrinology, Diabetes and Metabolism  [facility]

## 2020-05-27 ENCOUNTER — TELEPHONE (OUTPATIENT)
Dept: ENDOCRINOLOGY | Facility: CLINIC | Age: 65
End: 2020-05-27

## 2020-05-27 ENCOUNTER — VIRTUAL VISIT (OUTPATIENT)
Dept: ENDOCRINOLOGY | Facility: CLINIC | Age: 65
End: 2020-05-27
Payer: COMMERCIAL

## 2020-05-27 DIAGNOSIS — E05.00 GRAVES DISEASE: Primary | ICD-10-CM

## 2020-05-27 DIAGNOSIS — E11.9 TYPE 2 DIABETES MELLITUS WITHOUT COMPLICATION, WITHOUT LONG-TERM CURRENT USE OF INSULIN (H): ICD-10-CM

## 2020-05-27 DIAGNOSIS — E05.00 GRAVES DISEASE: ICD-10-CM

## 2020-05-27 RX ORDER — METHIMAZOLE 10 MG/1
20 TABLET ORAL DAILY
Qty: 180 TABLET | Refills: 3 | Status: SHIPPED | OUTPATIENT
Start: 2020-05-27 | End: 2021-08-17

## 2020-05-27 RX ORDER — METHIMAZOLE 10 MG/1
20 TABLET ORAL DAILY
Qty: 180 TABLET | Refills: 3 | Status: SHIPPED | OUTPATIENT
Start: 2020-05-27 | End: 2020-05-27

## 2020-05-27 NOTE — LETTER
"5/27/2020       RE: Annamaria Hall  [address]  [place] [address]     Dear Colleague,    Thank you for referring your patient, Annamaria Hall, to the [facility] ENDOCRINOLOGY at [facility]. Please see a copy of my visit note below.    Annamaria Hlal is a 64 year old female who is being evaluated via a billable telephone visit.      The patient has been notified of following:     \"This telephone visit will be conducted via a call between you and your physician/provider. We have found that certain health care needs can be provided without the need for a physical exam.  This service lets us provide the care you need with a short phone conversation.  If a prescription is necessary we can send it directly to your pharmacy.  If lab work is needed we can place an order for that and you can then stop by our lab to have the test done at a later time.    Telephone visits are billed at different rates depending on your insurance coverage. During this emergency period, for some insurers they may be billed the same as an in-person visit.  Please reach out to your insurance provider with any questions.    If during the course of the call the physician/provider feels a telephone visit is not appropriate, you will not be charged for this service.\"    Patient has given verbal consent for Telephone visit?  Yes    What phone number would you like to be contacted at? [phone]    How would you like to obtain your AVS? Mail a copy    Phone call duration: 21 minutes    Yarelis Petersen MD    Endocrinology Clinic Visit 5/27/2020  NAME:  Annamaria Hall  PCP:  No primary care provider on file.  MRN:  [id number]    Chief Complaint     Chief Complaint   Patient presents with     RECHECK     THYROID       History of Present Illness     Annamaria Hall is a 64 year old female who is seen called today via telephone visit for f/up on Graves disease.     She was diagnosed with subclinical " hyperthyroidism in Nov 2015 with suppressed TSH, normal T4 and T3. TPO was elevated. She has a daughter with hyperthyroidism as well. Workup Dec 2015 showed elevated free T3 of 4.0, a suppressed TSH of <0.05 and a normal free T4. I-123 uptake and scan done Jan 2016 showed a mildly elevated uptake of 35.8% at 24 hrs, that was homogeneous, consistent with Graves disease. Methimazole was started Feb 2016.     Last visit with me Jan 2019: at tat point, based on Labs done at [facility] 9/26/2018: TSH 0.02, fT4: 2.77, her PCP had increased her MMI dosage to 15 mg daily and then to 20 mg daily.     Interval history:   She has been taking methimazole 20 mg daily.   Last labs at [facility] a couple of months ago. She was everything looked good.   Her BG were high about a month ago.   Weight is up due to the quarantine.  She is on glipizide for her diabetes.     Problem List     Patient Active Problem List   Diagnosis     Graves disease        Medications     Current Outpatient Medications   Medication     gabapentin (NEURONTIN) 300 MG capsule     methimazole (TAPAZOLE) 10 MG tablet     No current facility-administered medications for this visit.         Allergies     Allergies   Allergen Reactions     Codeine Nausea and Vomiting     Lidocaine Nausea and Vomiting     Lidocaine patch       Medical / Surgical History     No past medical history on file.  No past surgical history on file.    Social History     Social History     Socioeconomic History     Marital status: Single     Spouse name: Not on file     Number of children: Not on file     Years of education: Not on file     Highest education level: Not on file   Occupational History     Not on file   Social Needs     Financial resource strain: Not on file     Food insecurity     Worry: Not on file     Inability: Not on file     Transportation needs     Medical: Not on file     Non-medical: Not on file   Tobacco Use     Smoking status: Not on file   Substance and Sexual Activity      Alcohol use: Not on file     Drug use: Not on file     Sexual activity: Not on file   Lifestyle     Physical activity     Days per week: Not on file     Minutes per session: Not on file     Stress: Not on file   Relationships     Social connections     Talks on phone: Not on file     Gets together: Not on file     Attends Scientology service: Not on file     Active member of club or organization: Not on file     Attends meetings of clubs or organizations: Not on file     Relationship status: Not on file     Intimate partner violence     Fear of current or ex partner: Not on file     Emotionally abused: Not on file     Physically abused: Not on file     Forced sexual activity: Not on file   Other Topics Concern     Not on file   Social History Narrative     Not on file       Family History     No family history on file.    ROS     Constitutional: no fevers, chills, night sweats. No weight loss/gain. No fatigue. Good appetite  Eyes: no vision changes, no eye redness, no diplopia  Ears, Nose, mouth, throat: no hearing changes, no tinnitus, no rhinorrhea, no nasal congestion  Cardiovascular: no chest pain, no orthopnea or PND, no edema, no palpitations  Respiratory: no dyspnea, no cough, no sputum, no wheezing  Gastrointestinal: no nausea, no vomiting, no abdominal pain, no diarrhea, no constipation  Genitourinary: no dysuria, no frequency, no urgency, no nocturia  Musculoskeletal: no joint pains, no back pain, no cramps, no fractures  Skin: no rash, no itching, no dryness, no ulcers, no hair loss, no nail changes  Neurological:no headaches, no weakness, no numbness, no tingling, no tremors, no difficulty sleeping  Psychiatric: no anxiety, no sadness  Hematologic/lymphatic: no easy bruising, no bleeding, no palor    Physical Exam   There were no vitals taken for this visit.   No physical exam was done due to this being a telephone visit.     Labs/Imaging     Pertinent Labs were reviewed and updated in EPIC.  I personally  reviewed the patient's outside records from [facility]. Summary of pertinent findings in HPI.    Impression / Plan     1. Graves disease:   Has been on methimazole 20 mg daily    Plan:   -We will check TSH  - adjust dose accordingly    2. Type 2 diabetes  Worse control lately  She is on glipizide  Check a1c     Follow up: 6 months      Yarelis Petersen MD  Endocrinology, Diabetes and Metabolism  [facility]      Again, thank you for allowing me to participate in the care of your patient.      Sincerely,    Yarelis Petersen MD

## 2020-05-27 NOTE — TELEPHONE ENCOUNTER
Rx order methimazole faxed to [facility] .  Iona Hanikns RN on 5/27/2020 at 1:46 PM

## 2020-05-27 NOTE — TELEPHONE ENCOUNTER
----- Message from Yarelis Petersen MD sent at 5/27/2020  9:02 AM CDT -----  Regarding: med order for patient - methimazole  I refilled this patient's med (Methimazole) to [facility] pharmacy in [place]. It said it cannot be electronic. So I med printed it (local print). Can you make sure I can sign it electronically then fax? If you do not see it on the printer, can you med print it and email it to me to sign?   Thanks  Yarelis

## 2020-05-27 NOTE — LETTER
Date:June 2, 2020      Patient was self referred, no letter generated. Do not send.        [facility] Information

## 2020-11-16 ENCOUNTER — DOCUMENTATION ONLY (OUTPATIENT)
Dept: CARE COORDINATION | Facility: CLINIC | Age: 65
End: 2020-11-16

## 2020-11-23 NOTE — PROGRESS NOTES
"Outcome for 11/23/20 11:59 AM :Left Voicemail for patient to call back -so  Outcome for 11/24/20 8:37 AM :Reached patient but they declined to share any data because they state they come in to upload      Annamaria Hall is a 65 year old female who is being evaluated via a billable telephone visit.      The patient has been notified of following:     \"This telephone visit will be conducted via a call between you and your physician/provider. We have found that certain health care needs can be provided without the need for a physical exam.  This service lets us provide the care you need with a short phone conversation.  If a prescription is necessary we can send it directly to your pharmacy.  If lab work is needed we can place an order for that and you can then stop by our lab to have the test done at a later time.    Telephone visits are billed at different rates depending on your insurance coverage. During this emergency period, for some insurers they may be billed the same as an in-person visit.  Please reach out to your insurance provider with any questions.    If during the course of the call the physician/provider feels a telephone visit is not appropriate, you will not be charged for this service.\"    Patient has given verbal consent for Telephone visit?  Yes    What phone number would you like to be contacted at? [phone]    How would you like to obtain your AVS? Mail a copy    Phone call duration: 25 minutes    Yarelis Petersen MD      Endocrinology Clinic Visit 11/25/20  NAME:  Annamaria Hall  PCP:  No primary care provider on file.  MRN:  [id number]    Chief Complaint     Chief Complaint   Patient presents with     Follow Up     graves disease, subclinical hyperthyroidism, diabetes type 2       History of Present Illness     Annamaria Hall is a 64 year old female who is called today via telephone visit for f/up on Graves disease.     She was diagnosed with subclinical hyperthyroidism in Nov " 2015 with suppressed TSH, normal T4 and T3. TPO was elevated. She has a daughter with hyperthyroidism as well. Workup Dec 2015 showed elevated free T3 of 4.0, a suppressed TSH of <0.05 and a normal free T4. I-123 uptake and scan done Jan 2016 showed a mildly elevated uptake of 35.8% at 24 hrs, that was homogeneous, consistent with Graves disease. Methimazole was started Feb 2016.     Jan 2019: Labs done at [facility] 9/26/2018: TSH 0.02, fT4: 2.77, her PCP had increased her MMI dosage to 15 mg daily and then to 20 mg daily.     Interval history:   Last visit via telephone was 5/27/2020.   She has been taking methimazole 20 mg daily. No TSH since last visit.     Her BG have been very high in the 300s. She was started on Ozempic 2 weeks ago, in addition to glipizide. Her dose is 0.25 mg weekly. BG now improving to 199 mg/dL this morning.   No nausea or vomiting. No GI issues with it. Hunger is down.   She has lost 10 lbs since being on it.   She is seeing Dr. Reyes at [facility].     Problem List     Patient Active Problem List   Diagnosis     Graves disease        Medications     Current Outpatient Medications   Medication     gabapentin (NEURONTIN) 300 MG capsule     glipiZIDE (GLUCOTROL) 5 MG tablet     methimazole (TAPAZOLE) 10 MG tablet     vitamin D2 (ERGOCALCIFEROL) 50232 units (1250 mcg) capsule     No current facility-administered medications for this visit.         Allergies     Allergies   Allergen Reactions     Codeine Nausea and Vomiting     Lidocaine Nausea and Vomiting     Lidocaine patch       Medical / Surgical History     No past medical history on file.  No past surgical history on file.    Social History     Social History     Socioeconomic History     Marital status: Single     Spouse name: Not on file     Number of children: Not on file     Years of education: Not on file     Highest education level: Not on file   Occupational History     Not on file   Social Needs     Financial resource strain: Not on file      Food insecurity     Worry: Not on file     Inability: Not on file     Transportation needs     Medical: Not on file     Non-medical: Not on file   Tobacco Use     Smoking status: Not on file   Substance and Sexual Activity     Alcohol use: Not on file     Drug use: Not on file     Sexual activity: Not on file   Lifestyle     Physical activity     Days per week: Not on file     Minutes per session: Not on file     Stress: Not on file   Relationships     Social connections     Talks on phone: Not on file     Gets together: Not on file     Attends Sabianism service: Not on file     Active member of club or organization: Not on file     Attends meetings of clubs or organizations: Not on file     Relationship status: Not on file     Intimate partner violence     Fear of current or ex partner: Not on file     Emotionally abused: Not on file     Physically abused: Not on file     Forced sexual activity: Not on file   Other Topics Concern     Not on file   Social History Narrative     Not on file       Family History     No family history on file.    ROS     Constitutional: no fevers, chills, night sweats. No weight loss/gain. No fatigue. Good appetite  Eyes: no vision changes, no eye redness, no diplopia  Ears, Nose, mouth, throat: no hearing changes, no tinnitus, no rhinorrhea, no nasal congestion  Cardiovascular: no chest pain, no orthopnea or PND, no edema, no palpitations  Respiratory: no dyspnea, no cough, no sputum, no wheezing  Gastrointestinal: no nausea, no vomiting, no abdominal pain, no diarrhea, no constipation  Genitourinary: no dysuria, no frequency, no urgency, no nocturia  Musculoskeletal: no joint pains, no back pain, no cramps, no fractures  Skin: no rash, no itching, no dryness, no ulcers, no hair loss, no nail changes  Neurological:no headaches, no weakness, no numbness, no tingling, no tremors, no difficulty sleeping  Psychiatric: no anxiety, no sadness  Hematologic/lymphatic: no easy bruising, no  bleeding, no palor    Physical Exam   There were no vitals taken for this visit.   No physical exam was done due to this being a telephone visit.     Labs/Imaging     Pertinent Labs were reviewed and updated in EPIC.  I personally reviewed the patient's outside records from [facility]. Summary of pertinent findings in HPI.    Impression / Plan     1. Graves disease:   Has been on methimazole 20 mg daily    Plan:   -We will check TSH  - adjust dose accordingly    2. Type 2 diabetes  Worse control lately  Started Ozempic by PCP 2 weeks ago  Plan to increase to 0,5 mg after being on it for 4 weeks.   Continue glipizide.     Follow up: 6 months      Yarelis Petersen MD  Endocrinology, Diabetes and Metabolism  [facility]

## 2020-11-24 RX ORDER — GLIPIZIDE 5 MG/1
TABLET ORAL
COMMUNITY
Start: 2020-04-06

## 2020-11-24 RX ORDER — ERGOCALCIFEROL 1.25 MG/1
50000 CAPSULE, LIQUID FILLED ORAL WEEKLY
COMMUNITY
Start: 2020-01-06

## 2020-11-25 ENCOUNTER — TELEPHONE (OUTPATIENT)
Dept: ENDOCRINOLOGY | Facility: CLINIC | Age: 65
End: 2020-11-25

## 2020-11-25 ENCOUNTER — VIRTUAL VISIT (OUTPATIENT)
Dept: ENDOCRINOLOGY | Facility: CLINIC | Age: 65
End: 2020-11-25
Payer: MEDICAID

## 2020-11-25 DIAGNOSIS — E11.9 TYPE 2 DIABETES MELLITUS WITHOUT COMPLICATION, WITHOUT LONG-TERM CURRENT USE OF INSULIN (H): ICD-10-CM

## 2020-11-25 DIAGNOSIS — E05.00 GRAVES DISEASE: Primary | ICD-10-CM

## 2020-11-25 PROCEDURE — 99214 OFFICE O/P EST MOD 30 MIN: CPT | Mod: TEL | Performed by: INTERNAL MEDICINE

## 2020-11-25 NOTE — LETTER
"11/25/2020       RE: Annamaria Hall  [address]  [facility] [address]     Dear Colleague,    Thank you for referring your patient, Annamaria Hall, to the [facility] ENDOCRINOLOGY CLINIC [place] at [facility]. Please see a copy of my visit note below.    Outcome for 11/23/20 11:59 AM :Left Voicemail for patient to call back -soj  Outcome for 11/24/20 8:37 AM :Reached patient but they declined to share any data because they state they come in to upload      Annamaria Hall is a 65 year old female who is being evaluated via a billable telephone visit.      The patient has been notified of following:     \"This telephone visit will be conducted via a call between you and your physician/provider. We have found that certain health care needs can be provided without the need for a physical exam.  This service lets us provide the care you need with a short phone conversation.  If a prescription is necessary we can send it directly to your pharmacy.  If lab work is needed we can place an order for that and you can then stop by our lab to have the test done at a later time.    Telephone visits are billed at different rates depending on your insurance coverage. During this emergency period, for some insurers they may be billed the same as an in-person visit.  Please reach out to your insurance provider with any questions.    If during the course of the call the physician/provider feels a telephone visit is not appropriate, you will not be charged for this service.\"    Patient has given verbal consent for Telephone visit?  Yes    What phone number would you like to be contacted at? [phone]    How would you like to obtain your AVS? Mail a copy    Phone call duration: 25 minutes    Yarelis Petersen MD      Endocrinology Clinic Visit 11/25/20  NAME:  Annamaria Hall  PCP:  No primary care provider on file.  MRN:  [id number]    Chief Complaint     Chief Complaint "   Patient presents with     Follow Up     graves disease, subclinical hyperthyroidism, diabetes type 2       History of Present Illness     Annamaria Hall is a 64 year old female who is called today via telephone visit for f/up on Graves disease.     She was diagnosed with subclinical hyperthyroidism in Nov 2015 with suppressed TSH, normal T4 and T3. TPO was elevated. She has a daughter with hyperthyroidism as well. Workup Dec 2015 showed elevated free T3 of 4.0, a suppressed TSH of <0.05 and a normal free T4. I-123 uptake and scan done Jan 2016 showed a mildly elevated uptake of 35.8% at 24 hrs, that was homogeneous, consistent with Graves disease. Methimazole was started Feb 2016.     Jan 2019: Labs done at [facility] 9/26/2018: TSH 0.02, fT4: 2.77, her PCP had increased her MMI dosage to 15 mg daily and then to 20 mg daily.     Interval history:   Last visit via telephone was 5/27/2020.   She has been taking methimazole 20 mg daily. No TSH since last visit.     Her BG have been very high in the 300s. She was started on Ozempic 2 weeks ago, in addition to glipizide. Her dose is 0.25 mg weekly. BG now improving to 199 mg/dL this morning.   No nausea or vomiting. No GI issues with it. Hunger is down.   She has lost 10 lbs since being on it.   She is seeing Dr. Reyes at [facility].     Problem List     Patient Active Problem List   Diagnosis     Graves disease        Medications     Current Outpatient Medications   Medication     gabapentin (NEURONTIN) 300 MG capsule     glipiZIDE (GLUCOTROL) 5 MG tablet     methimazole (TAPAZOLE) 10 MG tablet     vitamin D2 (ERGOCALCIFEROL) 63882 units (1250 mcg) capsule     No current facility-administered medications for this visit.         Allergies     Allergies   Allergen Reactions     Codeine Nausea and Vomiting     Lidocaine Nausea and Vomiting     Lidocaine patch       Medical / Surgical History     No past medical history on file.  No past surgical history on file.    Social  History     Social History     Socioeconomic History     Marital status: Single     Spouse name: Not on file     Number of children: Not on file     Years of education: Not on file     Highest education level: Not on file   Occupational History     Not on file   Social Needs     Financial resource strain: Not on file     Food insecurity     Worry: Not on file     Inability: Not on file     Transportation needs     Medical: Not on file     Non-medical: Not on file   Tobacco Use     Smoking status: Not on file   Substance and Sexual Activity     Alcohol use: Not on file     Drug use: Not on file     Sexual activity: Not on file   Lifestyle     Physical activity     Days per week: Not on file     Minutes per session: Not on file     Stress: Not on file   Relationships     Social connections     Talks on phone: Not on file     Gets together: Not on file     Attends Lutheran service: Not on file     Active member of club or organization: Not on file     Attends meetings of clubs or organizations: Not on file     Relationship status: Not on file     Intimate partner violence     Fear of current or ex partner: Not on file     Emotionally abused: Not on file     Physically abused: Not on file     Forced sexual activity: Not on file   Other Topics Concern     Not on file   Social History Narrative     Not on file       Family History     No family history on file.    ROS     Constitutional: no fevers, chills, night sweats. No weight loss/gain. No fatigue. Good appetite  Eyes: no vision changes, no eye redness, no diplopia  Ears, Nose, mouth, throat: no hearing changes, no tinnitus, no rhinorrhea, no nasal congestion  Cardiovascular: no chest pain, no orthopnea or PND, no edema, no palpitations  Respiratory: no dyspnea, no cough, no sputum, no wheezing  Gastrointestinal: no nausea, no vomiting, no abdominal pain, no diarrhea, no constipation  Genitourinary: no dysuria, no frequency, no urgency, no nocturia  Musculoskeletal:  no joint pains, no back pain, no cramps, no fractures  Skin: no rash, no itching, no dryness, no ulcers, no hair loss, no nail changes  Neurological:no headaches, no weakness, no numbness, no tingling, no tremors, no difficulty sleeping  Psychiatric: no anxiety, no sadness  Hematologic/lymphatic: no easy bruising, no bleeding, no palor    Physical Exam   There were no vitals taken for this visit.   No physical exam was done due to this being a telephone visit.     Labs/Imaging     Pertinent Labs were reviewed and updated in EPIC.  I personally reviewed the patient's outside records from [facility]. Summary of pertinent findings in HPI.    Impression / Plan     1. Graves disease:   Has been on methimazole 20 mg daily    Plan:   -We will check TSH  - adjust dose accordingly    2. Type 2 diabetes  Worse control lately  Started Ozempic by PCP 2 weeks ago  Plan to increase to 0,5 mg after being on it for 4 weeks.   Continue glipizide.     Follow up: 6 months      Yarelis Petersen MD  Endocrinology, Diabetes and Metabolism  [facility]          Again, thank you for allowing me to participate in the care of your patient.      Sincerely,    Yarelis Petersen MD

## 2020-11-25 NOTE — TELEPHONE ENCOUNTER
----- Message from Yarelis Petersen MD sent at 11/25/2020 10:50 AM CST -----  Can you email me a lab slip for this patient for TSH, so we can mail to her to have it done at [facility]?  Thanks

## 2020-11-25 NOTE — LETTER
Date:November 27, 2020      Patient was self referred, no letter generated. Do not send.        [facility] Health Information

## 2021-01-11 ENCOUNTER — TRANSFERRED RECORDS (OUTPATIENT)
Dept: HEALTH INFORMATION MANAGEMENT | Facility: CLINIC | Age: 66
End: 2021-01-11

## 2021-03-12 ENCOUNTER — TRANSFERRED RECORDS (OUTPATIENT)
Dept: HEALTH INFORMATION MANAGEMENT | Facility: CLINIC | Age: 66
End: 2021-03-12

## 2021-03-25 ENCOUNTER — TELEPHONE (OUTPATIENT)
Dept: ENDOCRINOLOGY | Facility: CLINIC | Age: 66
End: 2021-03-25

## 2021-03-25 NOTE — TELEPHONE ENCOUNTER
Spoke with patient about scheduling an appt in May as she will be due for a visit with Dr. Otero at that time. Informed her we are primarily still doing virtual, thus telephone appt was scheduled. Details confirmed with patient.

## 2021-05-14 NOTE — PROGRESS NOTES
Annamaria is a 65 year old who is being evaluated via a billable telephone visit.      What phone number would you like to be contacted at? [phone]  How would you like to obtain your AVS? Mail a copy    Assessment & Plan       1. Graves disease:   Has been on methimazole 20 mg daily  TSH 9.2 in Jan 2021.     Plan:   - reduce methimazole to 10 mg daily.   - recheck TSH in 6 weeks at [facility]      2. Type 2 diabetes  Improved control with Ozempic and glipizide. A1c at goal.   Continue same management.       Review of the result(s) of each unique test - A1c and TSH from scanned media results from [facility], January 2021.      25 minutes spent on the date of the encounter doing chart review, history and exam, documentation and further activities per the note       FUTURE APPOINTMENTS:       - Follow-up office or E-visit in 6 months    Return in about 6 months (around 11/18/2021).    Yarelis Petersen MD  [facility] ENDOCRINOLOGY CLINIC [place]    -------------------------------------------------------------------------------------  Subjective   Annamaria is a 65 year old who presents for the following health issues: Graves disease, diabetes.     HPI   She was diagnosed with subclinical hyperthyroidism in Nov 2015 with suppressed TSH, normal T4 and T3. TPO was elevated. She has a daughter with hyperthyroidism as well. Workup Dec 2015 showed elevated free T3 of 4.0, a suppressed TSH of <0.05 and a normal free T4. I-123 uptake and scan done Jan 2016 showed a mildly elevated uptake of 35.8% at 24 hrs, that was homogeneous, consistent with Graves disease. Methimazole was started Feb 2016.   Jan 2019: Labs done at [facility] 9/26/2018: TSH 0.02, fT4: 2.77, her PCP had increased her MMI dosage to 15 mg daily and then to 20 mg daily.     Interval history:   She continues to be taking 20 mg daily of methimazole daily.   TSH 9.2 January 2021.   For her diabetes, she has been on Ozempic since fall 2020. Dose is now 0.5 mg weekly. She is still  taking Glipizide (as needed if her BG goes over 200, which is rare).   She is managed by her PCP Dr. Reyes at [facility].   She only check blood sugars 1-2 times a week. Have been under 200 since starting Ozempic.  A1c 7.2 Jan 2021.     She sees Rheumatology for her rheumatoid arthritis.         Review of Systems   Constitutional, HEENT, cardiovascular, pulmonary, gi and gu systems are negative, except as otherwise noted.      Objective           Vitals:  No vitals were obtained today due to virtual visit.    Physical Exam   healthy, alert and no distress  PSYCH: Alert and oriented times 3; coherent speech, normal   rate and volume, able to articulate logical thoughts, able   to abstract reason, no tangential thoughts, no hallucinations   or delusions  Her affect is normal  RESP: No cough, no audible wheezing, able to talk in full sentences  Remainder of exam unable to be completed due to telephone visits        Phone call duration: 18 minutes

## 2021-05-17 RX ORDER — SEMAGLUTIDE 1.34 MG/ML
INJECTION, SOLUTION SUBCUTANEOUS
COMMUNITY

## 2021-05-18 ENCOUNTER — VIRTUAL VISIT (OUTPATIENT)
Dept: ENDOCRINOLOGY | Facility: CLINIC | Age: 66
End: 2021-05-18
Payer: MEDICAID

## 2021-05-18 DIAGNOSIS — E11.9 TYPE 2 DIABETES MELLITUS WITHOUT COMPLICATION, WITHOUT LONG-TERM CURRENT USE OF INSULIN (H): Primary | ICD-10-CM

## 2021-05-18 DIAGNOSIS — E05.00 GRAVES DISEASE: ICD-10-CM

## 2021-05-18 PROCEDURE — 99443 PR PHYSICIAN TELEPHONE EVALUATION 21-30 MIN: CPT | Mod: 95 | Performed by: INTERNAL MEDICINE

## 2021-05-18 NOTE — LETTER
5/18/2021       RE: Annamaria Hall  [address]  [place] [facility] [place] [facility] [address]     Dear Colleague,    Thank you for referring your patient, Annamaria Hall, to the [facility] ENDOCRINOLOGY CLINIC [place] at [facility]. Please see a copy of my visit note below.    Annamaria is a 65 year old who is being evaluated via a billable telephone visit.      What phone number would you like to be contacted at? [phone]  How would you like to obtain your AVS? Mail a copy    Assessment & Plan       1. Graves disease:   Has been on methimazole 20 mg daily  TSH 9.2 in Jan 2021.     Plan:   - reduce methimazole to 10 mg daily.   - recheck TSH in 6 weeks at [facility]      2. Type 2 diabetes  Improved control with Ozempic and glipizide. A1c at goal.   Continue same management.       Review of the result(s) of each unique test - A1c and TSH from scanned media results from [facility], January 2021.      25 minutes spent on the date of the encounter doing chart review, history and exam, documentation and further activities per the note       FUTURE APPOINTMENTS:       - Follow-up office or E-visit in 6 months    Return in about 6 months (around 11/18/2021).    Yarelis Petersen MD  [facility] ENDOCRINOLOGY CLINIC [place]    -------------------------------------------------------------------------------------  Subjective   Annamaria is a 65 year old who presents for the following health issues: Graves disease, diabetes.     HPI   She was diagnosed with subclinical hyperthyroidism in Nov 2015 with suppressed TSH, normal T4 and T3. TPO was elevated. She has a daughter with hyperthyroidism as well. Workup Dec 2015 showed elevated free T3 of 4.0, a suppressed TSH of <0.05 and a normal free T4. I-123 uptake and scan done Jan 2016 showed a mildly elevated uptake of 35.8% at 24 hrs, that was homogeneous, consistent with Graves disease. Methimazole was started Feb 2016.   Jan 2019: Labs done at [facility]  9/26/2018: TSH 0.02, fT4: 2.77, her PCP had increased her MMI dosage to 15 mg daily and then to 20 mg daily.     Interval history:   She continues to be taking 20 mg daily of methimazole daily.   TSH 9.2 January 2021.   For her diabetes, she has been on Ozempic since fall 2020. Dose is now 0.5 mg weekly. She is still taking Glipizide (as needed if her BG goes over 200, which is rare).   She is managed by her PCP Dr. Reyes at [facility].   She only check blood sugars 1-2 times a week. Have been under 200 since starting Ozempic.  A1c 7.2 Jan 2021.     She sees Rheumatology for her rheumatoid arthritis.         Review of Systems   Constitutional, HEENT, cardiovascular, pulmonary, gi and gu systems are negative, except as otherwise noted.      Objective           Vitals:  No vitals were obtained today due to virtual visit.    Physical Exam   healthy, alert and no distress  PSYCH: Alert and oriented times 3; coherent speech, normal   rate and volume, able to articulate logical thoughts, able   to abstract reason, no tangential thoughts, no hallucinations   or delusions  Her affect is normal  RESP: No cough, no audible wheezing, able to talk in full sentences  Remainder of exam unable to be completed due to telephone visits        Phone call duration: 18 minutes        Again, thank you for allowing me to participate in the care of your patient.      Sincerely,    Yarelis Petersen MD

## 2021-05-18 NOTE — LETTER
Date:May 23, 2021      Patient was self referred, no letter generated. Do not send.        [facility] Health Information

## 2021-08-14 ENCOUNTER — TELEPHONE (OUTPATIENT)
Dept: ENDOCRINOLOGY | Facility: CLINIC | Age: 66
End: 2021-08-14

## 2021-08-16 DIAGNOSIS — E05.00 GRAVES DISEASE: ICD-10-CM

## 2021-08-17 RX ORDER — METHIMAZOLE 10 MG/1
20 TABLET ORAL DAILY
Qty: 180 TABLET | Refills: 3 | Status: SHIPPED | OUTPATIENT
Start: 2021-08-17 | End: 2021-08-20

## 2021-08-17 NOTE — TELEPHONE ENCOUNTER
[facility] Call Center    Phone Message    May a detailed message be left on voicemail: yes     Reason for Call: Other: . Please refax: [phone]    Action Taken: Message routed to:  [facility] endocrin    Travel Screening: Not Applicable

## 2021-08-17 NOTE — TELEPHONE ENCOUNTER
methimazole (TAPAZOLE) 10 MG tablet      Last Written Prescription Date:  5/27/2020  Last Fill Quantity: 180 tab,   # refills: 3 *local print  Last Office Visit : 5/18/2021  Future Office visit:  none    Routing refill request to provider for review/approval because:  Medication not on the Endocrine refill protocol.  - Rx needs to be mailed patient's pharmacy has no escribe

## 2021-08-17 NOTE — TELEPHONE ENCOUNTER
[facility] Call Center    Phone Message    May a detailed message be left on voicemail: yes     Reason for Call: Medication Refill Request    Has the patient contacted the pharmacy for the refill? Yes     Name of medication being requested:   * methimazole (TAPAZOLE) 10 MG tablet    Provider who prescribed the medication: Nicola    Pharmacy: [facility]    Date medication is needed: 8/17/2021, Patient is out of medication.        Action Taken: Message routed to:  [facility] Refill    Travel Screening: Not Applicable

## 2021-08-20 DIAGNOSIS — E05.00 GRAVES DISEASE: ICD-10-CM

## 2021-08-20 NOTE — TELEPHONE ENCOUNTER
methimazole (TAPAZOLE) 10 MG tablet  Last Written Prescription Date:  8/17/2021  Last Fill Quantity: 180,   # refills: 3  Last Office Visit : 5/18/2021  Future Office visit:  None    Routing refill request to provider for review/approval because:  Please resend this order. This pharmacy does not receive  E-script orders.      Please print off signed med order and fax it to [phone].     Thank you   I work from home.   So I am not able to do this because we can not print off Pt information and I have no fax machine at home.    Thank you for your help.         Keiko Farooq RN  Central Triage Red Flags/Med Refills

## 2021-08-20 NOTE — TELEPHONE ENCOUNTER
[facility] Call Center    Phone Message    May a detailed message be left on voicemail: yes     Reason for Call: Medication Refill Request    Has the patient contacted the pharmacy for the refill? Yes     Name of medication being requested:   * methimazole (TAPAZOLE) 10 MG tablet     Provider who prescribed the medication: Yarelis Petersen    Pharmacy: [facility]    Date medication is needed: 8/20/2021     Patient is out of the medication    Action Taken: Message routed to:  [facility] Endo    Travel Screening: Not Applicable

## 2021-08-22 RX ORDER — METHIMAZOLE 10 MG/1
20 TABLET ORAL DAILY
Qty: 180 TABLET | Refills: 0 | Status: SHIPPED | OUTPATIENT
Start: 2021-08-22 | End: 2021-08-23

## 2021-08-23 DIAGNOSIS — E05.00 GRAVES DISEASE: ICD-10-CM

## 2021-08-23 RX ORDER — METHIMAZOLE 10 MG/1
20 TABLET ORAL DAILY
Qty: 180 TABLET | Refills: 0 | Status: SHIPPED | OUTPATIENT
Start: 2021-08-23